# Patient Record
Sex: FEMALE | Race: WHITE
[De-identification: names, ages, dates, MRNs, and addresses within clinical notes are randomized per-mention and may not be internally consistent; named-entity substitution may affect disease eponyms.]

---

## 2017-07-15 NOTE — UC
Complaint Female HPI





- HPI Summary


HPI Summary: 





26 y/o female presents to the urgent care c/o white vaginal discharge for the 

past 3 days.  Pt reports a fishy order and she thinks is BV since she had it 

before. Pt denies vaginal bleeding, pelvic pain, back pain or urinary symptoms, 

fever, SOB, chest pain.  LMP 7/7/2017 w/ regular menstrual cycles. No Hx of 

STDs.


She also reports mild LF knee pain with walking and flexing the knee.  She 

states she fell on 7/3/2017 and twisted her knee. Pain has been mild, specially 

on the lateral side of LF knee, but when she does exercise pain 7/10 and 

occasional feels instability.  Patient denies swelling or redness around knee. 

No other complains.











- History Of Current Complaint


Chief Complaint: UCLowerExtremity


Stated Complaint: URINARY,LEFT KNEE COMPLAINT


Time Seen by Provider: 07/15/17 21:02


Hx Obtained From: Patient


Hx Last Menstrual Period: 7/7/2017


Pregnant?: No


Onset/Duration: Sudden Onset, Lasting Days, Still Present


Timing: Constant


Severity Initially: Mild


Severity Currently: Moderate


Pain Intensity: 7 - Left knee with twisting


Pain Scale Used: 0-10 Numeric


Aggravating Factor(s): Movement


Associated Signs And Symptoms: Positive: Negative.  Negative: Fever, Back Pain, 

Vaginal Bleeding/Discharge, Nausea, Vomiting(# Of Episodes =), Genital Swelling





- Risk Factors


Ectopic Pregnancy Risk Factor: Negative





- Allergies/Home Medications


Allergies/Adverse Reactions: 


 Allergies











Allergy/AdvReac Type Severity Reaction Status Date / Time


 


No Known Allergies Allergy   Verified 07/15/17 21:02














PMH/Surg Hx/FS Hx/Imm Hx


Previously Healthy: Yes


Psychological History: Anxiety





- Surgical History


Surgical History: Yes


Surgery Procedure, Year, and Place: benign hair follicle tumor





- Family History


Known Family History: Positive: Cardiac Disease





- Social History


Occupation: Employed Full-time


Lives: With Family


Alcohol Use: Rare


Substance Use Type: None


Smoking Status (MU): Never Smoked Tobacco


Amount Used/How Often: occasional





Review of Systems


Constitutional: Negative


Skin: Negative


Eyes: Negative


ENT: Negative


Respiratory: Negative


Cardiovascular: Negative


Gastrointestinal: Negative


Genitourinary: Other - white vaginal discharge


Motor: Negative


Neurovascular: Negative


Musculoskeletal: Other: - LF knee pain


Neurological: Negative


Psychological: Negative


All Other Systems Reviewed And Are Negative: Yes





Physical Exam


Triage Information Reviewed: Yes


Appearance: Well-Appearing, No Pain Distress, Well-Nourished, Thin


Vital Signs: 


 Initial Vital Signs











Temp  99.4 F   07/15/17 20:45


 


Pulse  96   07/15/17 20:45


 


Resp  20   07/15/17 20:45


 


BP  121/78   07/15/17 20:45











Vital Signs Reviewed: Yes


Eye Exam: Normal


Eyes: Positive: Conjunctiva Clear - PERRLA, EOMI, fundi grossly normal


ENT Exam: Normal


ENT: Positive: Normal ENT inspection, Hearing grossly normal, Pharynx normal, 

TMs normal


Dental Exam: Normal


Neck exam: Normal


Neck: Positive: Supple, Nontender, No Lymphadenopathy


Respiratory Exam: Normal


Respiratory: Positive: Chest non-tender, Lungs clear, Normal breath sounds


Cardiovascular Exam: Normal


Cardiovascular: Positive: RRR, No Murmur, Pulses Normal


Abdominal Exam: Normal


Abdomen Description: Positive: Other: - Pelvic: I was assisted by Nurse Gaby. 

External genitalia within normal limits.  There is no lesions there is no 

masses noted.  Speculum exam: The vaginal walls are within normal limits w/ 

white clear vaginal discharge, with fishy odor, no the lesions or rashes.  The 

cervix is closed with no lesions or masses.  There is no CMT's, and no adnexal 

masses.  Sample sent to lab for G/C and affirm.    Rectal: No lesions, no 

hemorrhoids,


Bowel Sounds: Positive: Present


Musculoskeletal Exam: Normal


Musculoskeletal: Positive: Strength Intact, Other: - Lateral side of Left Knee 

with tenderness on deep palpation, no swelling or erythema observed.  Varus and 

valgus test: positive. Kacie's test negative, drawer test negative.


Neurological Exam: Normal


Psychological Exam: Normal


Skin Exam: Normal





 Complaint Female Dx





- Course


Course Of Treatment: 26 y/o female presents to the urgent care c/o white 

vaginal discharge for the past 3 days.  Pt reports a fishy order and she thinks 

is BV since she had it before. Pt denies vaginal bleeding, pelvic pain, back 

pain or urinary symptoms, fever, SOB, chest pain.  LMP 7/7/2017 w/ regular 

menstrual cycles. No Hx of STDs.  She also report mild LF knee pain with 

walking and flexing the knee.  She states she fell on 7/3/2017 and twisted her 

knee. Pain has been mild, specially on the lateral side of LF knee, but when 

she does exercise pain 7/10 and occasional feels instability.  Patient denies 

swelling or redness around knee. Hx obtained.  PE abnormal findings:  1-Pelvic: 

I was assisted by Nurse Gaby. External genitalia within normal limits.  There 

is no lesions there is no masses noted.  Speculum exam: The vaginal walls are 

within normal limits w/ white clear vaginal discharge, with fishy odor, no the 

lesions or rashes.  The cervix is closed with no lesions or masses.  There is 

no CMT's, and no adnexal masses.  Sample sent to lab for G/C and affirm.    

Rectal: No lesions, no hemorrhoids. Mos likely Bacterial Vaginosis. Pt RX 

Metronidazole 500mg PO x 7 days ad advised to avoid alcohol intake while taking 

antibiotic and if anything abnormal in the lab results, she will receive a call 

from us stacy further treatment. Pt understood and agreed.  2-Musculoskeletal: 

Positive: Strength Intact, Other: - Lateral side of Left Knee with tenderness 

on deep palpation, no swelling or erythema observed.  Varus test: positive. 

Kacie's test negative, drawer test negative. FROM of LF knee. Pt ambulates w/

o any limitation. PT's left knee immobilized with ace bandage and Rx ibuprofen 

prn to alleviate symptoms, advised RICE, avoind  standing for  long periods and 

exercise. Advised if symptoms persists to f/u with her PCP for further 

evaluation and treatment. Pt understood and agreed





- Differential Dx/Diagnosis


Differential Diagnosis/HQI/PQRI: Cervicitis, Pelvic Inflammatory Disease, 

Sexually Transmitted Disease, Other - vaginosis


Provider Diagnoses: 1- Bacterial Vaginosis.  2-Left knee pain





Discharge





- Discharge Plan


Condition: Stable


Disposition: HOME


Prescriptions: 


Ibuprofen TAB* [Motrin TAB* 800 MG] 800 mg PO Q6H #30 tab


Metronidazole [Flagyl 500 MG TAB] 500 mg PO BID #13 tab


Patient Education Materials:  Bacterial Vaginosis (ED), Knee Pain (ED)


Referrals: 


Morena Hutchinson MD [Primary Care Provider] - 1 Week


Additional Instructions: 


Please take medications as instructed and finish the full course of treatment 

to avoid recurrent infection. Avoid drinking alcohol while taking medication.  

Specimen sent to lab if anything else abnormal you will receive a call from us 

for further treatment. If you do not improve or if symptoms worsen after the 

course of antibiotics, you should either follow up with your PCP or return to 

the urgent care for further evaluation and treatment.  


For your knee pain Take ibuprofen prn after meals to alleviate symptoms and 

keep knee immobilized, place ice and keep leg elevated at night time. if 

symptoms do not improve f/u with your PCP for further evaluation and treatment.

## 2019-01-01 ENCOUNTER — HOSPITAL ENCOUNTER (EMERGENCY)
Dept: HOSPITAL 25 - UCCORT | Age: 29
Discharge: HOME | End: 2019-01-01
Payer: COMMERCIAL

## 2019-01-01 VITALS — DIASTOLIC BLOOD PRESSURE: 67 MMHG | SYSTOLIC BLOOD PRESSURE: 120 MMHG

## 2019-01-01 DIAGNOSIS — N30.01: Primary | ICD-10-CM

## 2019-01-01 DIAGNOSIS — F90.9: ICD-10-CM

## 2019-01-01 PROCEDURE — 87086 URINE CULTURE/COLONY COUNT: CPT

## 2019-01-01 PROCEDURE — 87077 CULTURE AEROBIC IDENTIFY: CPT

## 2019-01-01 PROCEDURE — 84702 CHORIONIC GONADOTROPIN TEST: CPT

## 2019-01-01 PROCEDURE — 99212 OFFICE O/P EST SF 10 MIN: CPT

## 2019-01-01 PROCEDURE — 81003 URINALYSIS AUTO W/O SCOPE: CPT

## 2019-01-01 PROCEDURE — 87186 SC STD MICRODIL/AGAR DIL: CPT

## 2019-01-01 PROCEDURE — G0463 HOSPITAL OUTPT CLINIC VISIT: HCPCS

## 2019-01-01 NOTE — UC
Complaint Female HPI





- HPI Summary


HPI Summary: 


28-year-old female presents with 3 day history of dysuria, frequency, sensation 

of inability to empty her bladder, and suprapubic pressure.  States today she 

did notice some blood in the urine.  Denies fever, chills, back or flank pain, 

abdominal pain, nausea, vomiting, vaginal discharge, dyspareunia, or abnormal 

vaginal bleeding.  Last menstrual period was approximately one month ago.  

States she is sexually active and does not use any form of birth control.





- History Of Current Complaint


Chief Complaint: UCGU


Stated Complaint: URINARY COMPLAINT


Time Seen by Provider: 01/01/19 12:38


Hx Obtained From: Patient


Hx Last Menstrual Period: BEGINNING OF DECEMBER 2018


Pain Intensity: 10





- Allergies/Home Medications


Allergies/Adverse Reactions: 


 Allergies











Allergy/AdvReac Type Severity Reaction Status Date / Time


 


No Known Allergies Allergy   Verified 01/01/19 12:36











Home Medications: 


 Home Medications





Methylphenidate ER TAB* [Concerta ER TAB*] 1 tab DAILY 01/01/19 [History 

Confirmed 01/01/19]


Methylphenidate TAB* [Ritalin TAB*] 1 tab DAILY 01/01/19 [History Confirmed 01/ 01/19]


hydrOXYzine HCL TAB* [Atarax TAB 50 MG *] 1 tab BEDTIME PRN 01/01/19 [History 

Confirmed 01/01/19]











PMH/Surg Hx/FS Hx/Imm Hx


Previously Healthy: Yes


Psychological History: Other - ADHD





- Surgical History


Surgical History: Yes


Surgery Procedure, Year, and Place: benign hair follicle tumor





- Family History


Known Family History: Positive: Cardiac Disease





- Social History


Occupation: Employed Full-time


Lives: Alone


Alcohol Use: Rare


Substance Use Type: None


Smoking Status (MU): Never Smoked Tobacco


Amount Used/How Often: occasional





Review of Systems


All Other Systems Reviewed And Are Negative: Yes


Constitutional: Negative: Fever, Chills


Respiratory: Positive: Negative


Cardiovascular: Positive: Negative


Gastrointestinal: Negative: Abdominal Pain, Vomiting, Diarrhea, Nausea


Genitourinary: Positive: Dysuria, Hematuria, Frequency, Urgency.  Negative: 

Vaginal/Penile Discharge, Abnormal Bleeding


Musculoskeletal: Positive: Negative


Is Patient Immunocompromised?: No





Physical Exam





- Summary


Physical Exam Summary: 


GENERAL APPEARANCE: Well developed, well nourished, alert and cooperative, and 

appears to be in no acute distress.








CARDIAC: Normal S1 and S2. No S3, S4 or murmurs. Rhythm is regular. There is no 

peripheral edema, cyanosis or pallor. Extremities are warm and well perfused. 

Capillary refill is less than 2 seconds.





LUNGS: Clear to auscultation and percussion without rales, rhonchi, wheezing or 

diminished breath sounds.





ABDOMEN: Positive bowel sounds. Soft, nondistended. Mild surpapubic tenderness. 

No guarding or rebound. No masses or hepatosplenomegally. No CVA tenderness.





MUSKULOSKELETAL: ROM intact to all extremities. No joint erythema or 

tenderness. Normal muscular development. Normal gait.





SKIN: Skin normal color, texture and turgor with no lesions or eruptions.





Triage Information Reviewed: Yes


Vital Signs: 


 Initial Vital Signs











Temp  97.1 F   01/01/19 12:38


 


Pulse  100   01/01/19 12:38


 


Resp  14   01/01/19 12:38


 


BP  120/67   01/01/19 12:38


 


Pulse Ox  100   01/01/19 12:38














Diagnostics





- Laboratory


Diagnostic Studies Completed/Ordered: Point-of-care urinalysis showed 3+ protein

, trace ketones, 3+ blood, positive nitrites, 1+ bilirubin, and 3+ leukocyte 

esterase. Urine pregnancy negative.





 Complaint Female Dx





- Course


Course Of Treatment: 28-year-old female presents with 3 day history of dysuria, 

frequency, sensation of inability to empty her bladder, and suprapubic 

pressure.  States today she did notice some blood in the urine.  Denies fever, 

chills, back or flank pain, abdominal pain, nausea, vomiting, vaginal discharge

, dyspareunia, or abnormal vaginal bleeding.  Last menstrual period was 

approximately one month ago.  States she is sexually active and does not use 

any form of birth control.  Afebrile.  Vital signs stable.  Exam is 

unremarkable except for some mild suprapubic tenderness.  Point-of-care 

urinalysis showed 3+ protein, trace ketones, 3+ blood, positive nitrites, 1+ 

bilirubin, and 3+ leukocyte esterase.  Urine pregnancy negative.  Urine culture 

is pending.  Will treat for acute cystitis with Bactrim DS 1 tablet twice a day 

5 days and will provide Pyridium 100 mg 3 times a day 2 days.  She is to 

follow-up with her primary care provider if symptoms do not improve.  Warning 

symptoms were reviewed with the patient she verbalizes understanding and agrees 

with plan of care.





- Differential Dx/Diagnosis


Differential Diagnosis/HQI/PQRI: Pregnancy, Renal Colic, Ureteral Stone, 

Urinary Tract Infection


Provider Diagnosis: 


 Acute cystitis with hematuria








Discharge





- Sign-Out/Discharge


Documenting (check all that apply): Patient Departure


All imaging exams completed and their final reports reviewed: No Studies





- Discharge Plan


Condition: Stable


Disposition: HOME


Prescriptions: 


Phenazopyridine TAB* [Pyridium 100 mg TAB*] 100 mg PO TID #6 tab


Sulfamethox/Trimethoprim DS* [Bactrim /160 TAB*] 1 tab PO BID #10 tab


Patient Education Materials:  Urinary Tract Infection in Women (ED)


Referrals: 


No Primary Care Phys,NOPCP [Primary Care Provider] - 


Additional Instructions: 


Your urine test in the clinic today is suggestive of a urinary tract infection. 

We will start you on an antibiotic to treat for the infection. We will also 

send a urine culture today to see what bacteria grow out and make sure the 

antibiotic you were prescribed is appropriate to treat the infection. It will 

take 48-72 hours to get these results. We will contact you if there is any 

change in your treatment plan.





Start Bactrim DS 1 tab twice a day for 5 days..





Take Pyridium 1 tablet every 8 hours for next 2 days to help with the 

discomfort. This medication will turn your urine an orange color.





Drink plenty of fluids.





To help prevent urinary tract infections:





1) Be sure to wipe from front to back.





2) Urinate immediately after any sexual intercourse.





3) Avoid taking bubble baths.





Follow up with your primary care provider in 5-7 days if symptoms persist.





Seek immediate medical attention in the emergency room if you develop fever 

greater than 100.5 F, have severe abdominal pain, persistent vomiting, or any 

worsening of symptoms.





- Billing Disposition and Condition


Condition: STABLE


Disposition: Home

## 2022-08-24 ENCOUNTER — HOSPITAL ENCOUNTER (EMERGENCY)
Facility: HOSPITAL | Age: 32
Discharge: HOME | End: 2022-08-24
Attending: EMERGENCY MEDICINE
Payer: COMMERCIAL

## 2022-08-24 VITALS
SYSTOLIC BLOOD PRESSURE: 133 MMHG | WEIGHT: 142.2 LBS | HEIGHT: 63 IN | BODY MASS INDEX: 25.2 KG/M2 | TEMPERATURE: 98.7 F | DIASTOLIC BLOOD PRESSURE: 90 MMHG | RESPIRATION RATE: 18 BRPM | HEART RATE: 67 BPM | OXYGEN SATURATION: 96 %

## 2022-08-24 DIAGNOSIS — K08.89 PAIN, DENTAL: ICD-10-CM

## 2022-08-24 DIAGNOSIS — K04.7 DENTAL INFECTION: Primary | ICD-10-CM

## 2022-08-24 PROCEDURE — 99284 EMERGENCY DEPT VISIT MOD MDM: CPT | Performed by: EMERGENCY MEDICINE

## 2022-08-24 PROCEDURE — 99283 EMERGENCY DEPT VISIT LOW MDM: CPT

## 2022-08-24 RX ORDER — CHLORHEXIDINE GLUCONATE 0.12 MG/ML
15 RINSE ORAL 2 TIMES DAILY
Qty: 120 ML | Refills: 0 | Status: SHIPPED | OUTPATIENT
Start: 2022-08-24

## 2022-08-24 RX ORDER — CLINDAMYCIN HYDROCHLORIDE 150 MG/1
450 CAPSULE ORAL ONCE
Status: COMPLETED | OUTPATIENT
Start: 2022-08-24 | End: 2022-08-24

## 2022-08-24 RX ORDER — CLINDAMYCIN HYDROCHLORIDE 150 MG/1
450 CAPSULE ORAL EVERY 8 HOURS SCHEDULED
Qty: 90 CAPSULE | Refills: 0 | Status: SHIPPED | OUTPATIENT
Start: 2022-08-24 | End: 2022-09-03

## 2022-08-24 RX ORDER — ACETAMINOPHEN 325 MG/1
650 TABLET ORAL EVERY 6 HOURS PRN
Qty: 120 TABLET | Refills: 0 | Status: SHIPPED | OUTPATIENT
Start: 2022-08-24

## 2022-08-24 RX ORDER — IBUPROFEN 600 MG/1
600 TABLET ORAL EVERY 6 HOURS PRN
Qty: 60 TABLET | Refills: 0 | Status: SHIPPED | OUTPATIENT
Start: 2022-08-24

## 2022-08-24 RX ORDER — IBUPROFEN 600 MG/1
600 TABLET ORAL ONCE
Status: COMPLETED | OUTPATIENT
Start: 2022-08-24 | End: 2022-08-24

## 2022-08-24 RX ORDER — ACETAMINOPHEN 325 MG/1
975 TABLET ORAL ONCE
Status: COMPLETED | OUTPATIENT
Start: 2022-08-24 | End: 2022-08-24

## 2022-08-24 RX ADMIN — ACETAMINOPHEN 975 MG: 325 TABLET, FILM COATED ORAL at 19:55

## 2022-08-24 RX ADMIN — IBUPROFEN 600 MG: 600 TABLET ORAL at 19:56

## 2022-08-24 RX ADMIN — CLINDAMYCIN HYDROCHLORIDE 450 MG: 150 CAPSULE ORAL at 19:55

## 2022-08-24 NOTE — DISCHARGE INSTRUCTIONS
Please follow up PCP and dentist  Recommend tylenol 650 mg and ibuprofen 600 mg every 6 hours as needed for pain  Please return for severe chest pain, significant shortness of breath, severely worsening symptoms, or any other concerning signs or symptoms  Please refer to the following documents for additional instructions and return precautions

## 2022-08-24 NOTE — ED PROVIDER NOTES
History  Chief Complaint   Patient presents with    Dental Pain     Right lower back tooth pain for several months, reports improvement after finishing first course of antibiotics, reports pain has recurred over the past several days  18-year-old female no reported past history presenting with dental pain  Reports caries and infection to right lower molar  Prescriptions for medications  Dental clinic follow-up  Patient reports at rehab facility and will follow up in [place]  None       History reviewed  No pertinent past medical history  History reviewed  No pertinent surgical history  History reviewed  No pertinent family history  I have reviewed and agree with the history as documented  E-Cigarette/Vaping    E-Cigarette Use Current Every Day User      E-Cigarette/Vaping Substances     Social History     Tobacco Use    Smoking status: Never Smoker    Smokeless tobacco: Never Used   Vaping Use    Vaping Use: Every day   Substance Use Topics    Alcohol use: Never    Drug use: Not Currently       Review of Systems    Physical Exam  Physical Exam    Vital Signs  ED Triage Vitals [08/24/22 1911]   Temperature Pulse Respirations Blood Pressure SpO2   98 7 °F (37 1 °C) 67 18 133/90 96 %      Temp Source Heart Rate Source Patient Position - Orthostatic VS BP Location FiO2 (%)   Oral Monitor Sitting Left arm --      Pain Score       --           Vitals:    08/24/22 1911   BP: 133/90   Pulse: 67   Patient Position - Orthostatic VS: Sitting         Visual Acuity      ED Medications  Medications - No data to display    Diagnostic Studies  Results Reviewed     None                 No orders to display              Procedures  Procedures         ED Course                               SBIRT 22yo+    Flowsheet Row Most Recent Value   SBIRT (23 yo +)    In order to provide better care to our patients, we are screening all of our patients for alcohol and drug use   Would it be okay to ask you these screening questions? Yes Filed at: 08/24/2022 1920   Initial Alcohol Screen: US AUDIT-C     1  How often do you have a drink containing alcohol? 0 Filed at: 08/24/2022 1920   2  How many drinks containing alcohol do you have on a typical day you are drinking? 0 Filed at: 08/24/2022 1920   3a  Male UNDER 65: How often do you have five or more drinks on one occasion? 0 Filed at: 08/24/2022 1920   3b  FEMALE Any Age, or MALE 65+: How often do you have 4 or more drinks on one occassion? 0 Filed at: 08/24/2022 1920   Audit-C Score 0 Filed at: 08/24/2022 1920   DAYA: How many times in the past year have you    Used an illegal drug or used a prescription medication for non-medical reasons? Never Filed at: 08/24/2022 1920                    MDM    Disposition  Final diagnoses:   Dental infection   Pain, dental     Time reflects when diagnosis was documented in both MDM as applicable and the Disposition within this note     Time User Action Codes Description Comment    8/24/2022  7:38 PM Jesus Manuel Mederos [K04 7] Dental infection     8/24/2022  7:38 PM Jesus Manuel Mederos [K08 89] Pain, dental       ED Disposition     ED Disposition   Discharge    Condition   Stable    Date/Time   Wed Aug 24, 2022 [id number] discharge to home/self care                 Follow-up Information     Follow up With Specialties Details Why Mateo Garzon  Call  for [phone]      [facility]  Schedule an appointment as soon as possible for a visit   [address] [facility] [address]  [address]  [phone]          Patient's Medications   Discharge Prescriptions    ACETAMINOPHEN (TYLENOL) 325 MG TABLET    Take 2 tablets (650 mg total) by mouth every 6 (six) hours as needed for mild pain       Start Date: 8/24/2022 End Date: --       Order Dose: 650 mg       Quantity: 120 tablet    Refills: 0    CHLORHEXIDINE (PERIDEX) 0 12 % SOLUTION    Apply 15 mL to the mouth or throat 2 (two) times a day       Start Date: 8/24/2022 End Date: --       Order Dose: 15 mL       Quantity: 120 mL    Refills: 0    CLINDAMYCIN (CLEOCIN) 150 MG CAPSULE    Take 3 capsules (450 mg total) by mouth every 8 (eight) hours for 10 days       Start Date: 8/24/2022 End Date: 9/3/2022       Order Dose: 450 mg       Quantity: 90 capsule    Refills: 0    IBUPROFEN (MOTRIN) 600 MG TABLET    Take 1 tablet (600 mg total) by mouth every 6 (six) hours as needed for mild pain       Start Date: 8/24/2022 End Date: --       Order Dose: 600 mg       Quantity: 60 tablet    Refills: 0       No discharge procedures on file      PDMP Review     None          ED Provider  Electronically Signed by orders to display              Procedures  Procedures         ED Course                               SBIRT 22yo+    Flowsheet Row Most Recent Value   SBIRT (25 yo +)    In order to provide better care to our patients, we are screening all of our patients for alcohol and drug use  Would it be okay to ask you these screening questions? Yes Filed at: 08/24/2022 1920   Initial Alcohol Screen: US AUDIT-C     1  How often do you have a drink containing alcohol? 0 Filed at: 08/24/2022 1920   2  How many drinks containing alcohol do you have on a typical day you are drinking? 0 Filed at: 08/24/2022 1920   3a  Male UNDER 65: How often do you have five or more drinks on one occasion? 0 Filed at: 08/24/2022 1920   3b  FEMALE Any Age, or MALE 65+: How often do you have 4 or more drinks on one occassion? 0 Filed at: 08/24/2022 1920   Audit-C Score 0 Filed at: 08/24/2022 1920   DAYA: How many times in the past year have you    Used an illegal drug or used a prescription medication for non-medical reasons? Never Filed at: 08/24/2022 1920                    MDM  Number of Diagnoses or Management Options  Dental infection  Pain, dental  Diagnosis management comments: 64-year-old female no reported past history presenting with dental pain  Has soon dental follow-up  Requesting antibiotic coverage until appointment  Given oral symptom management and antibiotics  Prescriptions given  Advised follow-up dentist  Discussed results and recommendations  Advised follow up PCP and dentist  Medication recommendations  Given instructions and return precautions  Patient/family at bedside acknowledged understanding of all written and verbal instructions and return precautions  Discharged          Amount and/or Complexity of Data Reviewed  Clinical lab tests: reviewed  Tests in the radiology section of CPT®: reviewed  Tests in the medicine section of CPT®: reviewed  Review and summarize past medical records: yes  Independent visualization of images, tracings, or specimens: yes    Risk of Complications, Morbidity, and/or Mortality  Presenting problems: low  Diagnostic procedures: low  Management options: low    Patient Progress  Patient progress: improved      Disposition  Final diagnoses:   Dental infection   Pain, dental     Time reflects when diagnosis was documented in both MDM as applicable and the Disposition within this note     Time User Action Codes Description Comment    8/24/2022  7:38 PM Reyna Mendez Add [K04 7] Dental infection     8/24/2022  7:38 PM Reyna Mendez Add [K08 89] Pain, dental       ED Disposition     ED Disposition   Discharge    Condition   Stable    Date/Time   Wed Aug 24, 2022  7:44 PM    Comment   Adelina Moses discharge to home/self care  Follow-up Information     Follow up With Specialties Details Why Contact Info    [employer]  Call  for [phone]      [facility]  Schedule an appointment as soon as possible for a visit   [address] [facility] [address]  [address] [address]  [phone]          Discharge Medication List as of 8/24/2022  7:47 PM      START taking these medications    Details   acetaminophen (TYLENOL) 325 mg tablet Take 2 tablets (650 mg total) by mouth every 6 (six) hours as needed for mild pain, Starting Wed 8/24/2022, Print      chlorhexidine (PERIDEX) 0 12 % solution Apply 15 mL to the mouth or throat 2 (two) times a day, Starting Wed 8/24/2022, Print      clindamycin (CLEOCIN) 150 mg capsule Take 3 capsules (450 mg total) by mouth every 8 (eight) hours for 10 days, Starting Wed 8/24/2022, Until Sat 9/3/2022, Print      ibuprofen (MOTRIN) 600 mg tablet Take 1 tablet (600 mg total) by mouth every 6 (six) hours as needed for mild pain, Starting Wed 8/24/2022, Print             No discharge procedures on file      PDMP Review     None          ED Provider  Electronically Signed by           Jaylin Harris MD  09/03/22 [id number]

## 2022-09-09 ENCOUNTER — HOSPITAL ENCOUNTER (EMERGENCY)
Facility: HOSPITAL | Age: 32
Discharge: HOME | End: 2022-09-09
Attending: EMERGENCY MEDICINE
Payer: COMMERCIAL

## 2022-09-09 VITALS
RESPIRATION RATE: 18 BRPM | OXYGEN SATURATION: 96 % | TEMPERATURE: 97 F | BODY MASS INDEX: 25.15 KG/M2 | DIASTOLIC BLOOD PRESSURE: 78 MMHG | HEART RATE: 99 BPM | WEIGHT: 142 LBS | SYSTOLIC BLOOD PRESSURE: 132 MMHG

## 2022-09-09 DIAGNOSIS — K08.89 TOOTH PAIN: ICD-10-CM

## 2022-09-09 DIAGNOSIS — K04.7 DENTAL ABSCESS: Primary | ICD-10-CM

## 2022-09-09 PROCEDURE — 99282 EMERGENCY DEPT VISIT SF MDM: CPT

## 2022-09-09 PROCEDURE — 99284 EMERGENCY DEPT VISIT MOD MDM: CPT | Performed by: EMERGENCY MEDICINE

## 2022-09-09 RX ORDER — AMOXICILLIN AND CLAVULANATE POTASSIUM 875; 125 MG/1; MG/1
1 TABLET, FILM COATED ORAL EVERY 12 HOURS
Qty: 14 TABLET | Refills: 0 | Status: SHIPPED | OUTPATIENT
Start: 2022-09-09 | End: 2022-09-16

## 2022-09-09 RX ORDER — AMOXICILLIN AND CLAVULANATE POTASSIUM 875; 125 MG/1; MG/1
1 TABLET, FILM COATED ORAL ONCE
Status: COMPLETED | OUTPATIENT
Start: 2022-09-09 | End: 2022-09-09

## 2022-09-09 RX ADMIN — AMOXICILLIN AND CLAVULANATE POTASSIUM 1 TABLET: 875; 125 TABLET, FILM COATED ORAL at 10:42

## 2022-09-09 NOTE — ED NOTES
Discharged reviewed with pt  Pt verbalized understanding and has no further questions at this time  Pt ambulatory off unit with steady gait       Elizabeth Osgood, RN  09/09/22 [id number]

## 2022-09-09 NOTE — ED PROVIDER NOTES
Pt Name: Michelle Morrissey  MRN: [id number]  [place] 1990  Age/Sex: 27 y o  female  Date of evaluation: 9/9/2022  PCP: No primary care provider on file  CHIEF COMPLAINT    Chief Complaint   Patient presents with    Dental Pain     Patient c/o right lower dental pain  Patient was seen here and treated about two weeks ago for same thing  HPI and MDM    27 y o  female presenting with right lower dental pain that started today  Patient states she was seen here a couple weeks ago, was started on antibiotics, this was her 2nd course of antibiotics  States the antibiotics helped but then the infection/pain comes back  No fevers or chills, no difficulty breathing or swallowing  Has not seen a dentist yet  There is concern for dental abscess  Patient advised to follow-up with dentist, advised to call them today  Given a dose of antibiotics in the emergency department, provided prescription for antibiotics  Advise strict dental follow-up, return precautions cast, patient verbalized understanding and is in agreement with plan  Medications   amoxicillin-clavulanate (AUGMENTIN) 875-125 mg per tablet 1 tablet (1 tablet Oral Given 9/9/22 1042)         Past Medical and Surgical History    History reviewed  No pertinent past medical history  Past Surgical History:   Procedure Laterality Date    HAND SURGERY         History reviewed  No pertinent family history  Social History     Tobacco Use    Smoking status: Never Smoker    Smokeless tobacco: Never Used   Vaping Use    Vaping Use: Every day   Substance Use Topics    Alcohol use: Never    Drug use: Not Currently           Allergies    No Known Allergies    Home Medications    Prior to Admission medications    Medication Sig Start Date End Date Taking?  Authorizing Provider   amoxicillin-clavulanate (AUGMENTIN) 875-125 mg per tablet Take 1 tablet by mouth every 12 (twelve) hours for 7 days 9/9/22 9/16/22 Yes Emily Nieto, DO acetaminophen (TYLENOL) 325 mg tablet Take 2 tablets (650 mg total) by mouth every 6 (six) hours as needed for mild pain 8/24/22   Darshan Harris MD   chlorhexidine (PERIDEX) 0 12 % solution Apply 15 mL to the mouth or throat 2 (two) times a day 8/24/22   Darshan Harris MD   ibuprofen (MOTRIN) 600 mg tablet Take 1 tablet (600 mg total) by mouth every 6 (six) hours as needed for mild pain 8/24/22   Darshan Harris MD           Review of Systems    Review of Systems   Constitutional: Negative for chills and fever  HENT: Positive for dental problem  Negative for sore throat and trouble swallowing  Respiratory: Negative for cough and shortness of breath  Cardiovascular: Negative for chest pain and palpitations  Gastrointestinal: Negative for nausea and vomiting  Musculoskeletal: Negative for arthralgias and joint swelling  Neurological: Negative for headaches  Physical Exam      ED Triage Vitals [09/09/22 0951]   Temperature Pulse Respirations Blood Pressure SpO2   (!) 97 °F (36 1 °C) 99 18 132/78 96 %      Temp Source Heart Rate Source Patient Position - Orthostatic VS BP Location FiO2 (%)   Oral Monitor Sitting Left arm --      Pain Score       --               Physical Exam  Constitutional:       Appearance: She is not ill-appearing  HENT:      Head: Normocephalic and atraumatic  Right Ear: External ear normal       Left Ear: External ear normal       Nose: Nose normal       Mouth/Throat:      Comments: Right lower dental fracture, mild purulence and erythema, tender to percussion  Floor of mouth is not raised  Uvula is midline  No posterior oropharyngeal erythema or exudates  No facial swelling or erythema  Eyes:      Conjunctiva/sclera: Conjunctivae normal       Pupils: Pupils are equal, round, and reactive to light  Cardiovascular:      Rate and Rhythm: Normal rate and regular rhythm  Pulmonary:      Effort: Pulmonary effort is normal  No respiratory distress     Abdominal: General: There is no distension  Musculoskeletal:         General: No deformity  Normal range of motion  Cervical back: Normal range of motion  No rigidity  Skin:     General: Skin is warm  Findings: No erythema  Neurological:      Mental Status: She is alert and oriented to person, place, and time  Diagnostic Results      Labs:    Results Reviewed     None          All labs reviewed and utilized in the medical decision making process    Radiology:    No orders to display       All radiology studies independently viewed by me and interpreted by the radiologist     Procedure    Procedures        FINAL IMPRESSION    Final diagnoses:   Dental abscess   Tooth pain         DISPOSITION    Time reflects when diagnosis was documented in both MDM as applicable and the Disposition within this note     Time User Action Codes Description Comment    9/9/2022 10:32 AM Starleen Labrum Add [K04 7] Dental abscess     9/9/2022 10:32 AM Starleen Labrum Add [K08 89] Tooth pain       ED Disposition     ED Disposition   Discharge    Condition   Stable    Date/Time   Fri Sep 9, 2022 10:32 AM    Comment   [facility] discharge to home/self care                 Follow-up Information     Follow up With Specialties Details Why Contact [facility] and [address]  Call today  [address] [place]ohue  [phone]            PATIENT REFERRED TO:    [facility] and [address]  [place] [address] [address]  [phone]  Call today        DISCHARGE MEDICATIONS:    Discharge Medication List as of 9/9/2022 10:33 AM      START taking these medications    Details   amoxicillin-clavulanate (AUGMENTIN) 875-125 mg per tablet Take 1 tablet by mouth every 12 (twelve) hours for 7 days, Starting Fri 9/9/2022, Until Fri 9/16/2022, Normal         CONTINUE these medications which have NOT CHANGED    Details   acetaminophen (TYLENOL) 325 mg tablet Take 2 tablets (650 mg total) by mouth every 6 (six) hours as needed for mild pain, Starting Wed 8/24/2022, Print      chlorhexidine (PERIDEX) 0 12 % solution Apply 15 mL to the mouth or throat 2 (two) times a day, Starting Wed 8/24/2022, Print      ibuprofen (MOTRIN) 600 mg tablet Take 1 tablet (600 mg total) by mouth every 6 (six) hours as needed for mild pain, Starting Wed 8/24/2022, Print             No discharge procedures on file  Vadim Mustafa DO        This note was partially completed using voice recognition technology, and was scanned for gross errors; however some errors may still exist  Please contact the author with any questions or requests for clarification        Vadim Mustafa DO  09/09/22 0999

## 2022-10-23 ENCOUNTER — HOSPITAL ENCOUNTER (EMERGENCY)
Facility: HOSPITAL | Age: 32
Discharge: HOME | End: 2022-10-23
Attending: EMERGENCY MEDICINE
Payer: COMMERCIAL

## 2022-10-23 VITALS
OXYGEN SATURATION: 97 % | HEART RATE: 94 BPM | DIASTOLIC BLOOD PRESSURE: 80 MMHG | TEMPERATURE: 98.8 F | RESPIRATION RATE: 18 BRPM | SYSTOLIC BLOOD PRESSURE: 126 MMHG

## 2022-10-23 DIAGNOSIS — J04.0 LARYNGITIS: Primary | ICD-10-CM

## 2022-10-23 LAB
FLUAV RNA RESP QL NAA+PROBE: NEGATIVE
FLUBV RNA RESP QL NAA+PROBE: NEGATIVE
RSV RNA RESP QL NAA+PROBE: NEGATIVE
SARS-COV-2 RNA RESP QL NAA+PROBE: NEGATIVE

## 2022-10-23 PROCEDURE — 0241U HB NFCT DS VIR RESP RNA 4 TRGT: CPT | Performed by: EMERGENCY MEDICINE

## 2022-10-23 RX ADMIN — DEXAMETHASONE SODIUM PHOSPHATE 10 MG: 10 INJECTION, SOLUTION INTRAMUSCULAR; INTRAVENOUS at 20:01

## 2022-11-06 NOTE — ED PROVIDER NOTES
History  Chief Complaint   Patient presents with   • Sore Throat     Pt reports being sick for the last 5 days, and complains of a sore throat and losing her voice, states its been lingering and it wont go away     26-year-old female presenting emergency department for evaluation of sore throat  Patient reports 5 days cough congestion feeling generally ill, as well as sore throat which has now progressed to a hoarse voice and has not gotten any better  She denies fevers or chills patient shortness of breath  She has decreased appetite but is still able to tolerate p o   Is otherwise healthy and well  Prior to Admission Medications   Prescriptions Last Dose Informant Patient Reported? Taking?   acetaminophen (TYLENOL) 325 mg tablet   No No   Sig: Take 2 tablets (650 mg total) by mouth every 6 (six) hours as needed for mild pain   chlorhexidine (PERIDEX) 0 12 % solution   No No   Sig: Apply 15 mL to the mouth or throat 2 (two) times a day   ibuprofen (MOTRIN) 600 mg tablet   No No   Sig: Take 1 tablet (600 mg total) by mouth every 6 (six) hours as needed for mild pain      Facility-Administered Medications: None       No past medical history on file  Past Surgical History:   Procedure Laterality Date   • HAND SURGERY         No family history on file  I have reviewed and agree with the history as documented  E-Cigarette/Vaping   • E-Cigarette Use Current Every Day User      E-Cigarette/Vaping Substances     Social History     Tobacco Use   • Smoking status: Never Smoker   • Smokeless tobacco: Never Used   Vaping Use   • Vaping Use: Every day   Substance Use Topics   • Alcohol use: Never   • Drug use: Not Currently       Review of Systems   Constitutional: Negative for appetite change, chills, fatigue and fever  HENT: Positive for sore throat and voice change  Negative for sneezing  Eyes: Negative for visual disturbance     Respiratory: Negative for cough, choking, chest tightness, shortness of breath and wheezing  Cardiovascular: Negative for chest pain and palpitations  Gastrointestinal: Negative for abdominal pain, constipation, diarrhea, nausea and vomiting  Genitourinary: Negative for difficulty urinating and dysuria  Neurological: Negative for dizziness, weakness, light-headedness, numbness and headaches  All other systems reviewed and are negative  Physical Exam  Physical Exam  Vitals and nursing note reviewed  Constitutional:       General: She is not in acute distress  Appearance: She is well-developed  She is not diaphoretic  HENT:      Head: Normocephalic and atraumatic  Comments: Mildly hoarse voice is noted  Patient states this is improved compared to previous day, but is not quite yet her baseline  There is some posterior or pharyngeal erythema noted, no tonsillomegaly, no tonsillar exudates  No stridor on exam   Eyes:      Pupils: Pupils are equal, round, and reactive to light  Neck:      Vascular: No JVD  Trachea: No tracheal deviation  Cardiovascular:      Rate and Rhythm: Normal rate and regular rhythm  Heart sounds: Normal heart sounds  No murmur heard  No friction rub  No gallop  Pulmonary:      Effort: Pulmonary effort is normal  No respiratory distress  Breath sounds: Normal breath sounds  No wheezing or rales  Abdominal:      General: Bowel sounds are normal  There is no distension  Palpations: Abdomen is soft  Tenderness: There is no abdominal tenderness  There is no guarding or rebound  Skin:     General: Skin is warm and dry  Coloration: Skin is not pale  Neurological:      Mental Status: She is alert and oriented to person, place, and time  Cranial Nerves: No cranial nerve deficit  Motor: No abnormal muscle tone     Psychiatric:         Behavior: Behavior normal          Vital Signs  ED Triage Vitals [10/23/22 1801]   Temperature Pulse Respirations Blood Pressure SpO2   98 8 °F (37 1 °C) 94 18 126/80 97 %      Temp Source Heart Rate Source Patient Position - Orthostatic VS BP Location FiO2 (%)   Oral Monitor Sitting Left arm --      Pain Score       --           Vitals:    10/23/22 1801   BP: 126/80   Pulse: 94   Patient Position - Orthostatic VS: Sitting         Visual Acuity      ED Medications  Medications   dexamethasone oral liquid 10 mg 1 mL (10 mg Oral Given 10/23/22 2001)       Diagnostic Studies  Results Reviewed     Procedure Component Value Units Date/Time    FLU/RSV/COVID - if FLU/RSV clinically relevant [[id number]]  (Normal) Collected: 10/23/22 1830    Lab Status: Final result Specimen: Nares from Nose Updated: 10/23/22 1933     SARS-CoV-2 Negative     INFLUENZA A PCR Negative     INFLUENZA B PCR Negative     RSV PCR Negative    Narrative:      FOR PEDIATRIC PATIENTS - copy/paste COVID Guidelines URL to browser: [email or web address]    SARS-CoV-2 assay is a Nucleic Acid Amplification assay intended for the  qualitative detection of nucleic acid from SARS-CoV-2 in nasopharyngeal  swabs  Results are for the presumptive identification of SARS-CoV-2 RNA  Positive results are indicative of infection with SARS-CoV-2, the virus  causing COVID-19, but do not rule out bacterial infection or co-infection  with other viruses  Laboratories within the [place] and its  territories are required to report all positive results to the appropriate  public health authorities  Negative results do not preclude SARS-CoV-2  infection and should not be used as the sole basis for treatment or other  patient management decisions  Negative results must be combined with  clinical observations, patient history, and epidemiological information  This test has not been FDA cleared or approved  This test has been authorized by FDA under an Emergency Use Authorization  (EUA)   This test is only authorized for the duration of time the  declaration that circumstances exist justifying the authorization of the  emergency use of an in vitro diagnostic tests for detection of SARS-CoV-2  virus and/or diagnosis of COVID-19 infection under section 564(b)(1) of  the Act, 21 U  S C  595FOO-9(N)(7), unless the authorization is terminated  or revoked sooner  The test has been validated but independent review by FDA  and CLIA is pending  Test performed using e-SENS GeneXpert: This RT-PCR assay targets N2,  a region unique to SARS-CoV-2  A conserved region in the E-gene was chosen  for pan-Sarbecovirus detection which includes SARS-CoV-2  According to CMS-2020-01-R, this platform meets the definition of high-throughput technology  No orders to display              Procedures  Procedures         ED Course                               SBIRT 20yo+    Flowsheet Row Most Recent Value   SBIRT (23 yo +)    In order to provide better care to our patients, we are screening all of our patients for alcohol and drug use  Would it be okay to ask you these screening questions? Yes Filed at: 10/23/2022 1932   Initial Alcohol Screen: US AUDIT-C     1  How often do you have a drink containing alcohol? 0 Filed at: 10/23/2022 1932   2  How many drinks containing alcohol do you have on a typical day you are drinking? 0 Filed at: 10/23/2022 1932   3b  FEMALE Any Age, or MALE 65+: How often do you have 4 or more drinks on one occassion? 0 Filed at: 10/23/2022 1932   Audit-C Score 0 Filed at: 10/23/2022 1932   DAYA: How many times in the past year have you    Used an illegal drug or used a prescription medication for non-medical reasons?  Never Filed at: 10/23/2022 1932                    MDM  Number of Diagnoses or Management Options  Laryngitis  Diagnosis management comments: 25-year-old female with likely viral pharyngitis/laryngitis, will give Decadron here, recommend continue use of Tylenol/NSAIDs/supportive care at home, will also check flu/RSV/COVID here       Disposition  Final diagnoses:   Laryngitis     Time reflects when diagnosis was documented in both MDM as applicable and the Disposition within this note     Time User Action Codes Description Comment    10/23/2022  7:58 PM Nanci Rangel Add [J04 0] Laryngitis       ED Disposition     ED Disposition   Discharge    Condition   Stable    Date/Time   Sun Oct 23, 2022  7:58 PM    Comment   Khari Magda discharge to home/self care  Follow-up Information    None         Discharge Medication List as of 10/23/2022  7:58 PM      CONTINUE these medications which have NOT CHANGED    Details   acetaminophen (TYLENOL) 325 mg tablet Take 2 tablets (650 mg total) by mouth every 6 (six) hours as needed for mild pain, Starting Wed 8/24/2022, Print      chlorhexidine (PERIDEX) 0 12 % solution Apply 15 mL to the mouth or throat 2 (two) times a day, Starting Wed 8/24/2022, Print      ibuprofen (MOTRIN) 600 mg tablet Take 1 tablet (600 mg total) by mouth every 6 (six) hours as needed for mild pain, Starting Wed 8/24/2022, Print             No discharge procedures on file      PDMP Review     None          ED Provider  Electronically Signed by           Darcy Pearson MD  11/06/22 5875